# Patient Record
Sex: MALE | Race: WHITE | NOT HISPANIC OR LATINO | Employment: OTHER | URBAN - METROPOLITAN AREA
[De-identification: names, ages, dates, MRNs, and addresses within clinical notes are randomized per-mention and may not be internally consistent; named-entity substitution may affect disease eponyms.]

---

## 2017-01-27 ENCOUNTER — OFFICE VISIT (OUTPATIENT)
Dept: UROLOGY | Facility: CLINIC | Age: 62
End: 2017-01-27
Payer: COMMERCIAL

## 2017-01-27 VITALS
RESPIRATION RATE: 18 BRPM | HEART RATE: 61 BPM | HEIGHT: 66 IN | WEIGHT: 142 LBS | BODY MASS INDEX: 22.82 KG/M2 | SYSTOLIC BLOOD PRESSURE: 155 MMHG | DIASTOLIC BLOOD PRESSURE: 88 MMHG

## 2017-01-27 DIAGNOSIS — R97.20 ELEVATED PSA: Primary | ICD-10-CM

## 2017-01-27 DIAGNOSIS — N40.0 BPH WITHOUT URINARY OBSTRUCTION: ICD-10-CM

## 2017-01-27 LAB
BILIRUB SERPL-MCNC: NEGATIVE MG/DL
BLOOD, POC UA: NEGATIVE
GLUCOSE UR QL STRIP: NEGATIVE
KETONES UR QL STRIP: NEGATIVE
LEUKOCYTE ESTERASE URINE, POC: NEGATIVE
NITRITE, POC UA: NEGATIVE
PH, POC UA: 5
PROTEIN, POC: NEGATIVE
SPECIFIC GRAVITY, POC UA: 1.01
UROBILINOGEN, POC UA: NEGATIVE

## 2017-01-27 PROCEDURE — 99214 OFFICE O/P EST MOD 30 MIN: CPT | Mod: 25,S$GLB,, | Performed by: UROLOGY

## 2017-01-27 PROCEDURE — 81000 URINALYSIS NONAUTO W/SCOPE: CPT | Mod: S$GLB,,, | Performed by: UROLOGY

## 2017-01-27 PROCEDURE — 1159F MED LIST DOCD IN RCRD: CPT | Mod: S$GLB,,, | Performed by: UROLOGY

## 2017-01-27 PROCEDURE — 99999 PR PBB SHADOW E&M-EST. PATIENT-LVL III: CPT | Mod: PBBFAC,,, | Performed by: UROLOGY

## 2017-01-27 RX ORDER — ASPIRIN 81 MG/1
81 TABLET ORAL DAILY
COMMUNITY

## 2017-01-27 RX ORDER — BENAZEPRIL HYDROCHLORIDE 20 MG/1
20 TABLET ORAL DAILY
COMMUNITY
Start: 2016-03-03

## 2017-01-27 RX ORDER — ATORVASTATIN CALCIUM 10 MG/1
10 TABLET, FILM COATED ORAL DAILY
COMMUNITY

## 2017-01-27 RX ORDER — METOPROLOL SUCCINATE 50 MG/1
50 TABLET, EXTENDED RELEASE ORAL DAILY
COMMUNITY
Start: 2016-11-05

## 2017-01-27 RX ORDER — LEVOTHYROXINE SODIUM 25 UG/1
25 TABLET ORAL
COMMUNITY
Start: 2016-03-03 | End: 2018-09-14

## 2017-01-27 NOTE — MR AVS SNAPSHOT
"    [facility]  [address] [facility] [address]  [place] [address]  Phone: [phone]                  Nikita Hoguen   2017 2:00 PM   Office Visit    Description:  Male : 1955   Provider:  MARTY So MD   Department:  [facility]           Reason for Visit     Elevated PSA           Diagnoses this Visit        Comments    Elevated PSA    -  Primary            To Do List           Goals (5 Years of Data)     None      [facility]     [facility] On Call Nurse Care Line -  Assistance  Registered nurses in the [facility] On Call Center provide clinical advisement, health education, appointment booking, and other advisory services.  Call for this free service at [phone].             Medications                Verify that the below list of medications is an accurate representation of the medications you are currently taking.  If none reported, the list may be blank. If incorrect, please contact your healthcare provider. Carry this list with you in case of emergency.           Current Medications     aspirin (ECOTRIN) 81 MG EC tablet Take 81 mg by mouth.    atorvastatin (LIPITOR) 10 MG tablet Take 10 mg by mouth.    benazepril (LOTENSIN) 20 MG tablet     levothyroxine (SYNTHROID) 25 MCG tablet     TOPROL XL 50 mg 24 hr tablet            Clinical Reference Information           Vital Signs - Last Recorded  Most recent update: 2017  2:27 PM by Ruba Moreno LPN    BP Pulse Resp Ht Wt BMI    (!) 155/88 (BP Location: Right arm, Patient Position: Sitting, BP Method: Automatic) 61 18 5' 6" (1.676 m) 64.4 kg (141 lb 15.6 oz) 22.92 kg/m2      Blood Pressure          Most Recent Value    BP  (!)  155/88      Allergies as of 2017     No Known Allergies      Immunizations Administered on Date of Encounter - 2017     None      Orders Placed During Today's Visit      Normal Orders This Visit    POCT URINALYSIS       "

## 2017-01-27 NOTE — PROGRESS NOTES
Subjective:       Patient ID: Nikita Rush is a 61 y.o. male.    Chief Complaint: Elevated PSA (6 month follow up)    HPI     61 year old with elevated PSA. He gets regular annual screening and his previous PSA have all been low until Jan 2015, He PSA was increased to 4.2 and then repeat several months later and it increased to 5.7.  He underwent prostate biopsy June 2015 and the path was mostly benign, 1 core HGPIN.  He has no complaints today. His PSA is 6.4 and has been stable for the last year.  He had no complication from the last biopsy. He denies hematuria and dysuria.  He has no family history of prostate cancer.  Urine dipstick shows negative for all components.    Review of Systems   Constitutional: Negative for fever.   Genitourinary: Negative for dysuria and hematuria.       Objective:      Physical Exam   Constitutional: He is oriented to person, place, and time. He appears well-developed and well-nourished.   HENT:   Head: Normocephalic and atraumatic.   Eyes: Conjunctivae are normal.   Cardiovascular: Normal rate.    Pulmonary/Chest: Effort normal.   Abdominal: He exhibits no mass. No hernia.   Genitourinary: Rectal exam shows no mass and anal tone normal. Prostate is enlarged (40g s/s/a). Prostate is not tender.   Musculoskeletal: Normal range of motion.   Neurological: He is alert and oriented to person, place, and time.   Skin: Skin is warm and dry. No rash noted.   Psychiatric: He has a normal mood and affect.   Vitals reviewed.      Assessment:       1. Elevated PSA        Plan:       Elevated PSA  -     POCT URINALYSIS      Follow-up 6 months with repeat PSA.

## 2017-07-28 ENCOUNTER — OFFICE VISIT (OUTPATIENT)
Dept: UROLOGY | Facility: CLINIC | Age: 62
End: 2017-07-28
Payer: COMMERCIAL

## 2017-07-28 VITALS
WEIGHT: 145.75 LBS | BODY MASS INDEX: 23.42 KG/M2 | HEIGHT: 66 IN | DIASTOLIC BLOOD PRESSURE: 87 MMHG | SYSTOLIC BLOOD PRESSURE: 147 MMHG | HEART RATE: 63 BPM

## 2017-07-28 DIAGNOSIS — N40.0 BPH WITHOUT URINARY OBSTRUCTION: ICD-10-CM

## 2017-07-28 DIAGNOSIS — R97.20 ELEVATED PSA: Primary | ICD-10-CM

## 2017-07-28 LAB
BILIRUB SERPL-MCNC: NORMAL MG/DL
BLOOD URINE, POC: NORMAL
COLOR, POC UA: YELLOW
GLUCOSE UR QL STRIP: NORMAL
KETONES UR QL STRIP: NORMAL
LEUKOCYTE ESTERASE URINE, POC: NORMAL
NITRITE, POC UA: NORMAL
PH, POC UA: 5
PROTEIN, POC: NORMAL
SPECIFIC GRAVITY, POC UA: 1.01
UROBILINOGEN, POC UA: NORMAL

## 2017-07-28 PROCEDURE — 99214 OFFICE O/P EST MOD 30 MIN: CPT | Mod: 25,S$GLB,, | Performed by: UROLOGY

## 2017-07-28 PROCEDURE — 99999 PR PBB SHADOW E&M-EST. PATIENT-LVL III: CPT | Mod: PBBFAC,,, | Performed by: UROLOGY

## 2017-07-28 PROCEDURE — 81002 URINALYSIS NONAUTO W/O SCOPE: CPT | Mod: S$GLB,,, | Performed by: UROLOGY

## 2017-07-28 NOTE — PROGRESS NOTES
Subjective:       Patient ID: Nikita Rush is a 62 y.o. male.    Chief Complaint: Elevated PSA    HPI     62 year old with elevated PSA.  He gets regular annual screening and his previous PSA have all been low until Jan 2015, He PSA was increased to 4.2 and then repeat several months later and it increased to 5.7.  He underwent prostate biopsy June 2015 and the path was mostly benign, 1 core HGPIN.  He has no complaints today. His PSA is 6.2 and has been stable for the last year.  PSA 6 months ago was 6.4.  He had no complication from the last biopsy.  He denies hematuria and dysuria.  He has no family history of prostate cancer.  Urine dipstick shows negative for all components.    Review of Systems   Constitutional: Negative for fever.   Genitourinary: Negative for dysuria and hematuria.       Objective:      Physical Exam   Constitutional: He is oriented to person, place, and time. He appears well-developed and well-nourished.   HENT:   Head: Normocephalic and atraumatic.   Eyes: Conjunctivae are normal.   Cardiovascular: Normal rate.    Pulmonary/Chest: Effort normal.   Genitourinary: Rectal exam shows no mass and anal tone normal. Prostate is enlarged (>50g s/s/a). Prostate is not tender.   Musculoskeletal: Normal range of motion.   Neurological: He is alert and oriented to person, place, and time.   Skin: Skin is warm and dry. No rash noted.   Psychiatric: He has a normal mood and affect.   Vitals reviewed.      Assessment:       1. Elevated PSA    2. BPH without urinary obstruction        Plan:       Elevated PSA  -     POCT URINE DIPSTICK WITHOUT MICROSCOPE    BPH without urinary obstruction      Will continue to observe.  RTC 6 months with repeat PSA

## 2018-01-26 ENCOUNTER — OFFICE VISIT (OUTPATIENT)
Dept: UROLOGY | Facility: CLINIC | Age: 63
End: 2018-01-26
Payer: COMMERCIAL

## 2018-01-26 VITALS
SYSTOLIC BLOOD PRESSURE: 112 MMHG | HEIGHT: 66 IN | WEIGHT: 145.5 LBS | DIASTOLIC BLOOD PRESSURE: 62 MMHG | BODY MASS INDEX: 23.38 KG/M2 | HEART RATE: 78 BPM

## 2018-01-26 DIAGNOSIS — R97.20 ELEVATED PSA: Primary | ICD-10-CM

## 2018-01-26 DIAGNOSIS — N40.0 BPH WITHOUT URINARY OBSTRUCTION: ICD-10-CM

## 2018-01-26 LAB
BILIRUB SERPL-MCNC: NORMAL MG/DL
BLOOD URINE, POC: NORMAL
COLOR, POC UA: YELLOW
GLUCOSE UR QL STRIP: NORMAL
KETONES UR QL STRIP: NORMAL
LEUKOCYTE ESTERASE URINE, POC: NORMAL
NITRITE, POC UA: NORMAL
PH, POC UA: 6
PROTEIN, POC: NORMAL
SPECIFIC GRAVITY, POC UA: 1
UROBILINOGEN, POC UA: NORMAL

## 2018-01-26 PROCEDURE — 81002 URINALYSIS NONAUTO W/O SCOPE: CPT | Mod: S$GLB,,, | Performed by: UROLOGY

## 2018-01-26 PROCEDURE — 99214 OFFICE O/P EST MOD 30 MIN: CPT | Mod: 25,S$GLB,, | Performed by: UROLOGY

## 2018-01-26 PROCEDURE — 99999 PR PBB SHADOW E&M-EST. PATIENT-LVL III: CPT | Mod: PBBFAC,,, | Performed by: UROLOGY

## 2018-01-26 RX ORDER — LEVOFLOXACIN 500 MG/1
500 TABLET, FILM COATED ORAL DAILY
Qty: 3 TABLET | Refills: 0 | Status: SHIPPED | OUTPATIENT
Start: 2018-01-26 | End: 2018-01-29

## 2018-01-26 NOTE — PROGRESS NOTES
Subjective:       Patient ID: Nikita Rush is a 62 y.o. male.    Chief Complaint: Elevated PSA    HPI     62 year old with elevated PSA.  He gets regular annual screening and his previous PSA have all been low until Jan 2015, He PSA was increased to 4.2 and then repeat several months later and it increased to 5.7.  He underwent prostate biopsy June 2015 and the path was mostly benign, 1 core HGPIN.  He has no complaints today. His PSA is now increased ti 7.8 from 6.2 (07/2017)  He had no complication from the last biopsy.  He denies hematuria and dysuria.  He has no family history of prostate cancer.  Urine dipstick shows negative for all components.      Review of Systems   Constitutional: Negative for fever.   Genitourinary: Negative for dysuria and hematuria.       Objective:      Physical Exam   Constitutional: He is oriented to person, place, and time. He appears well-developed and well-nourished.   HENT:   Head: Normocephalic and atraumatic.   Eyes: Conjunctivae are normal.   Cardiovascular: Normal rate.    Pulmonary/Chest: Effort normal.   Genitourinary: Rectal exam shows no mass and anal tone normal. Prostate is enlarged (40g, s/s/a). Prostate is not tender.   Musculoskeletal: Normal range of motion. He exhibits no edema.   Neurological: He is alert and oriented to person, place, and time.   Skin: Skin is warm and dry. No rash noted.   Psychiatric: He has a normal mood and affect.   Vitals reviewed.      Assessment:       1. Elevated PSA    2. BPH without urinary obstruction        Plan:       Elevated PSA  -     POCT URINE DIPSTICK WITHOUT MICROSCOPE    BPH without urinary obstruction    Other orders  -     levoFLOXacin (LEVAQUIN) 500 MG tablet; Take 1 tablet (500 mg total) by mouth once daily.  Dispense: 3 tablet; Refill: 0      I recommend repeat prostate biopsy.  Hold ASA for 5 days

## 2018-02-02 ENCOUNTER — TELEPHONE (OUTPATIENT)
Dept: UROLOGY | Facility: CLINIC | Age: 63
End: 2018-02-02

## 2018-02-02 NOTE — TELEPHONE ENCOUNTER
Pt requested to move his surgery date bact due to work. I spoke to svetlana in the [facility] and moved his surgery to 3/6/18. PT verbalized understanding.

## 2018-02-02 NOTE — TELEPHONE ENCOUNTER
----- Message from Yeimy Lua sent at 2/2/2018  9:07 AM CST -----  Contact: Patient  Patient states that he would like to talk to you about the procedure that is scheduled for 02/20/2018.  Please call the patient back at [phone].  Thank you

## 2018-03-05 ENCOUNTER — ANESTHESIA EVENT (OUTPATIENT)
Dept: SURGERY | Facility: HOSPITAL | Age: 63
End: 2018-03-05
Payer: COMMERCIAL

## 2018-03-06 ENCOUNTER — ANESTHESIA (OUTPATIENT)
Dept: SURGERY | Facility: HOSPITAL | Age: 63
End: 2018-03-06
Payer: COMMERCIAL

## 2018-03-06 ENCOUNTER — HOSPITAL ENCOUNTER (OUTPATIENT)
Facility: HOSPITAL | Age: 63
Discharge: HOME | End: 2018-03-06
Attending: UROLOGY | Admitting: UROLOGY
Payer: COMMERCIAL

## 2018-03-06 ENCOUNTER — SURGERY (OUTPATIENT)
Age: 63
End: 2018-03-06

## 2018-03-06 VITALS
TEMPERATURE: 98 F | SYSTOLIC BLOOD PRESSURE: 120 MMHG | OXYGEN SATURATION: 98 % | HEART RATE: 75 BPM | BODY MASS INDEX: 23.3 KG/M2 | DIASTOLIC BLOOD PRESSURE: 66 MMHG | HEIGHT: 66 IN | WEIGHT: 145 LBS | RESPIRATION RATE: 16 BRPM

## 2018-03-06 DIAGNOSIS — R97.20 ELEVATED PSA: Primary | ICD-10-CM

## 2018-03-06 PROCEDURE — 76942 ECHO GUIDE FOR BIOPSY: CPT | Mod: 26,59,, | Performed by: UROLOGY

## 2018-03-06 PROCEDURE — 36000704 HC OR TIME LEV I 1ST 15 MIN: Mod: PO | Performed by: UROLOGY

## 2018-03-06 PROCEDURE — 88305 TISSUE EXAM BY PATHOLOGIST: CPT | Performed by: PATHOLOGY

## 2018-03-06 PROCEDURE — 37000008 HC ANESTHESIA 1ST 15 MINUTES: Mod: PO | Performed by: UROLOGY

## 2018-03-06 PROCEDURE — 55700 PR BIOPSY OF PROSTATE,NEEDLE/PUNCH: CPT | Mod: ,,, | Performed by: UROLOGY

## 2018-03-06 PROCEDURE — D9220A PRA ANESTHESIA: Mod: CRNA,,, | Performed by: NURSE ANESTHETIST, CERTIFIED REGISTERED

## 2018-03-06 PROCEDURE — 63600175 PHARM REV CODE 636 W HCPCS: Mod: PO | Performed by: NURSE ANESTHETIST, CERTIFIED REGISTERED

## 2018-03-06 PROCEDURE — 36000705 HC OR TIME LEV I EA ADD 15 MIN: Mod: PO | Performed by: UROLOGY

## 2018-03-06 PROCEDURE — 25000003 PHARM REV CODE 250: Mod: PO | Performed by: UROLOGY

## 2018-03-06 PROCEDURE — 37000009 HC ANESTHESIA EA ADD 15 MINS: Mod: PO | Performed by: UROLOGY

## 2018-03-06 PROCEDURE — 71000033 HC RECOVERY, INTIAL HOUR: Mod: PO | Performed by: UROLOGY

## 2018-03-06 PROCEDURE — 88305 TISSUE EXAM BY PATHOLOGIST: CPT | Mod: 26,,, | Performed by: PATHOLOGY

## 2018-03-06 PROCEDURE — 76872 US TRANSRECTAL: CPT | Mod: 26,,, | Performed by: UROLOGY

## 2018-03-06 PROCEDURE — D9220A PRA ANESTHESIA: Mod: ANES,,, | Performed by: ANESTHESIOLOGY

## 2018-03-06 PROCEDURE — 63600175 PHARM REV CODE 636 W HCPCS: Mod: PO | Performed by: UROLOGY

## 2018-03-06 RX ORDER — FENTANYL CITRATE 50 UG/ML
INJECTION, SOLUTION INTRAMUSCULAR; INTRAVENOUS
Status: DISCONTINUED | OUTPATIENT
Start: 2018-03-06 | End: 2018-03-06

## 2018-03-06 RX ORDER — LIDOCAINE HCL/PF 100 MG/5ML
SYRINGE (ML) INTRAVENOUS
Status: DISCONTINUED | OUTPATIENT
Start: 2018-03-06 | End: 2018-03-06

## 2018-03-06 RX ORDER — SODIUM CHLORIDE 9 MG/ML
INJECTION, SOLUTION INTRAVENOUS CONTINUOUS
Status: DISCONTINUED | OUTPATIENT
Start: 2018-03-06 | End: 2018-03-06

## 2018-03-06 RX ORDER — LIDOCAINE HYDROCHLORIDE 10 MG/ML
0.5 INJECTION, SOLUTION EPIDURAL; INFILTRATION; INTRACAUDAL; PERINEURAL ONCE AS NEEDED
Status: DISCONTINUED | OUTPATIENT
Start: 2018-03-06 | End: 2018-03-06 | Stop reason: HOSPADM

## 2018-03-06 RX ORDER — LIDOCAINE HYDROCHLORIDE 10 MG/ML
1 INJECTION, SOLUTION EPIDURAL; INFILTRATION; INTRACAUDAL; PERINEURAL ONCE
Status: DISCONTINUED | OUTPATIENT
Start: 2018-03-06 | End: 2018-03-06 | Stop reason: HOSPADM

## 2018-03-06 RX ORDER — GENTAMICIN SULFATE 80 MG/100ML
80 INJECTION, SOLUTION INTRAVENOUS
Status: COMPLETED | OUTPATIENT
Start: 2018-03-06 | End: 2018-03-06

## 2018-03-06 RX ORDER — MIDAZOLAM HYDROCHLORIDE 1 MG/ML
INJECTION, SOLUTION INTRAMUSCULAR; INTRAVENOUS
Status: DISCONTINUED | OUTPATIENT
Start: 2018-03-06 | End: 2018-03-06

## 2018-03-06 RX ORDER — PROPOFOL 10 MG/ML
VIAL (ML) INTRAVENOUS
Status: DISCONTINUED | OUTPATIENT
Start: 2018-03-06 | End: 2018-03-06

## 2018-03-06 RX ORDER — SODIUM CHLORIDE, SODIUM LACTATE, POTASSIUM CHLORIDE, CALCIUM CHLORIDE 600; 310; 30; 20 MG/100ML; MG/100ML; MG/100ML; MG/100ML
INJECTION, SOLUTION INTRAVENOUS CONTINUOUS
Status: DISCONTINUED | OUTPATIENT
Start: 2018-03-06 | End: 2018-03-06 | Stop reason: HOSPADM

## 2018-03-06 RX ORDER — LIDOCAINE HYDROCHLORIDE 10 MG/ML
INJECTION INFILTRATION; PERINEURAL
Status: DISCONTINUED | OUTPATIENT
Start: 2018-03-06 | End: 2018-03-06 | Stop reason: HOSPADM

## 2018-03-06 RX ADMIN — LIDOCAINE HYDROCHLORIDE 50 MG: 20 INJECTION PARENTERAL at 07:03

## 2018-03-06 RX ADMIN — Medication 80 MG: at 06:03

## 2018-03-06 RX ADMIN — LIDOCAINE HYDROCHLORIDE 10 ML: 10 INJECTION, SOLUTION INFILTRATION; PERINEURAL at 07:03

## 2018-03-06 RX ADMIN — PROPOFOL 50 MG: 10 INJECTION, EMULSION INTRAVENOUS at 07:03

## 2018-03-06 RX ADMIN — FENTANYL CITRATE 100 MCG: 50 INJECTION, SOLUTION INTRAMUSCULAR; INTRAVENOUS at 07:03

## 2018-03-06 RX ADMIN — MIDAZOLAM HYDROCHLORIDE 2 MG: 1 INJECTION, SOLUTION INTRAMUSCULAR; INTRAVENOUS at 06:03

## 2018-03-06 RX ADMIN — SODIUM CHLORIDE, SODIUM LACTATE, POTASSIUM CHLORIDE, CALCIUM CHLORIDE: 600; 310; 30; 20 INJECTION, SOLUTION INTRAVENOUS at 06:03

## 2018-03-06 NOTE — OP NOTE
[facility]  Surgery Department  Operative Note    SUMMARY     Date of Procedure: 3/6/2018     Procedure: Procedure(s) (LRB):  TRANSRECTAL ULTRASOUND GUIDED PROSTATE BIOPSY (N/A)     Surgeon(s) and Role:     * MARTY So MD - Primary    Assisting Surgeon: None    Pre-Operative Diagnosis: Elevated PSA [R97.20]    Post-Operative Diagnosis: Post-Op Diagnosis Codes:     * Elevated PSA [R97.20]    Anesthesia: Local MAC    Technical Procedures Used: endoscopy    Description of the Findings of the Procedure: 94.8 vol     Significant Surgical Tasks Conducted by the Assistant(s), if Applicable: n/a    Complications: No    Estimated Blood Loss (EBL): * No values recorded between 3/6/2018  7:06 AM and 3/6/2018  7:20 AM *           Implants: * No implants in log *    Specimens:   Specimen (12h ago through future)    Start     Ordered    03/06/18 0708  Specimen to Pathology - Surgery  Once     Comments:  Pre-op Diagnosis: Elevated PSA [R97.20]Post-op Diagnosis: unknownProcedure(s):TRANSRECTAL ULTRASOUND GUIDED PROSTATE BIOPSY Number of specimens: Name of specimens: 1. Left base 2. Left mid 3. Left apex 4. Right base 5. Right mid 6. Right apex      03/06/18 0709                  Condition: Stable    Disposition: PACU - hemodynamically stable.    Attestation: I performed the procedure.

## 2018-03-06 NOTE — DISCHARGE SUMMARY
[facility]  Discharge Note  Short Stay    Admit Date: 3/6/2018    Discharge Date and Time: No discharge date for patient encounter.     Attending Physician: MARTY So MD     Discharge Provider: CYNTHIA So    Diagnoses:  Active Hospital Problems    Diagnosis  POA    Elevated PSA [R97.20]  Yes      Resolved Hospital Problems    Diagnosis Date Resolved POA   No resolved problems to display.       Discharged Condition: good    Hospital Course: Patient was admitted for an outpatient procedure and tolerated the procedure well with no complications.    Final Diagnoses: Same as principal problem.    Disposition: Home or Self Care    Follow up/Patient Instructions:    Medications:  Reconciled Home Medications:   Current Discharge Medication List      CONTINUE these medications which have NOT CHANGED    Details   aspirin (ECOTRIN) 81 MG EC tablet Take 81 mg by mouth once daily.       atorvastatin (LIPITOR) 10 MG tablet Take 10 mg by mouth once daily.       benazepril (LOTENSIN) 20 MG tablet Take 20 mg by mouth once daily.       levothyroxine (SYNTHROID) 25 MCG tablet Take 25 mcg by mouth before breakfast.       TOPROL XL 50 mg 24 hr tablet Take 50 mg by mouth once daily.              Discharge Procedure Orders  Call MD for:  temperature >100.4     Call MD for:  persistent nausea and vomiting or diarrhea     Call MD for:  severe uncontrolled pain     Activity as tolerated   Scheduling Instructions: Keep hydrated  I will call for follow up           Discharge Procedure Orders (must include Diet, Follow-up, Activity):    Discharge Procedure Orders (must include Diet, Follow-up, Activity)  Call MD for:  temperature >100.4     Call MD for:  persistent nausea and vomiting or diarrhea     Call MD for:  severe uncontrolled pain     Activity as tolerated   Scheduling Instructions: Keep hydrated  I will call for follow up

## 2018-03-06 NOTE — DISCHARGE INSTRUCTIONS
PROSTATE BIOPSY      DOS:   Minimal activity for 24 hours.   May shower or bathe today.   Advance diet as tolerated.   Drink a lot of liquids until you see your doctor.   Expect blood in urine/stool for up to 3 weeks.   Expect blood in semen for up to 8 weeks.   Resume home medications as prescribed   Biopsy results may not be available for 10-14 days    DONT:   No driving for 24 hours or while taking narcotic pain medication   No aspirin, NSAIDS or blood thinners for 7 days   No sexual intercourse, heavy lifting, or strenuous activity for 7 days.   DO NOT TAKE ADDITIONAL TYLENOL/ACETAMINOPHEN WHILE TAKING NARCOTIC PAIN MEDICATION THAT CONTAINS TYLENOL/ACETAMINOPHEN.    CALL PHYSICIAN FOR:   Unable to urinate within 6 hours after surgery.   Excessive bleeding.    Fever>101   Persistent pain not relieved by pain medication.    Contact your physician for emergencies at [phone]       Discharge Instructions: After Your Surgery  Youve just had surgery. During surgery, you were given medicine called anesthesia to keep you relaxed and free of pain. After surgery, you may have some pain or nausea. This is common. Here are some tips for feeling better and getting well after surgery.     Stay on schedule with your medicine.   Going home  Your healthcare provider will show you how to take care of yourself when you go home. He or she will also answer your questions. Have an adult family member or friend drive you home. For the first 24 hours after your surgery:  · Do not drive or use heavy equipment.  · Do not make important decisions or sign legal papers.  · Do not drink alcohol.  · Have someone stay with you, if needed. He or she can watch for problems and help keep you safe.  Be sure to go to all follow-up visits with your healthcare provider. And rest after your surgery for as long as your healthcare provider tells you to.  Coping with pain  If you have pain after surgery, pain medicine will help  you feel better. Take it as told, before pain becomes severe. Also, ask your healthcare provider or pharmacist about other ways to control pain. This might be with heat, ice, or relaxation. And follow any other instructions your surgeon or nurse gives you.  Tips for taking pain medicine  To get the best relief possible, remember these points:  · Pain medicines can upset your stomach. Taking them with a little food may help.  · Most pain relievers taken by mouth need at least 20 to 30 minutes to start to work.  · Taking medicine on a schedule can help you remember to take it. Try to time your medicine so that you can take it before starting an activity. This might be before you get dressed, go for a walk, or sit down for dinner.  · Constipation is a common side effect of pain medicines. Call your healthcare provider before taking any medicines such as laxatives or stool softeners to help ease constipation. Also ask if you should skip any foods. Drinking lots of fluids and eating foods such as fruits and vegetables that are high in fiber can also help. Remember, do not take laxatives unless your surgeon has prescribed them.  · Drinking alcohol and taking pain medicine can cause dizziness and slow your breathing. It can even be deadly. Do not drink alcohol while taking pain medicine.  · Pain medicine can make you react more slowly to things. Do not drive or run machinery while taking pain medicine.  Your healthcare provider may tell you to take acetaminophen to help ease your pain. Ask him or her how much you are supposed to take each day. Acetaminophen or other pain relievers may interact with your prescription medicines or other over-the-counter (OTC) medicines. Some prescription medicines have acetaminophen and other ingredients. Using both prescription and OTC acetaminophen for pain can cause you to overdose. Read the labels on your OTC medicines with care. This will help you to clearly know the list of  ingredients, how much to take, and any warnings. It may also help you not take too much acetaminophen. If you have questions or do not understand the information, ask your pharmacist or healthcare provider to explain it to you before you take the OTC medicine.  Managing nausea  Some people have an upset stomach after surgery. This is often because of anesthesia, pain, or pain medicine, or the stress of surgery. These tips will help you handle nausea and eat healthy foods as you get better. If you were on a special food plan before surgery, ask your healthcare provider if you should follow it while you get better. These tips may help:  · Do not push yourself to eat. Your body will tell you when to eat and how much.  · Start off with clear liquids and soup. They are easier to digest.  · Next try semi-solid foods, such as mashed potatoes, applesauce, and gelatin, as you feel ready.  · Slowly move to solid foods. Dont eat fatty, rich, or spicy foods at first.  · Do not force yourself to have 3 large meals a day. Instead eat smaller amounts more often.  · Take pain medicines with a small amount of solid food, such as crackers or toast, to avoid nausea.     Call your surgeon if  · You still have pain an hour after taking medicine. The medicine may not be strong enough.  · You feel too sleepy, dizzy, or groggy. The medicine may be too strong.  · You have side effects like nausea, vomiting, or skin changes, such as rash, itching, or hives.       If you have obstructive sleep apnea  You were given anesthesia medicine during surgery to keep you comfortable and free of pain. After surgery, you may have more apnea spells because of this medicine and other medicines you were given. The spells may last longer than usual.   At home:  · Keep using the continuous positive airway pressure (CPAP) device when you sleep. Unless your healthcare provider tells you not to, use it when you sleep, day or night. CPAP is a common device used  to treat obstructive sleep apnea.  · Talk with your provider before taking any pain medicine, muscle relaxants, or sedatives. Your provider will tell you about the possible dangers of taking these medicines.  Date Last Reviewed: 12/1/2016 © 2000-2017 [employer]. [address] [place] [id number]. All rights reserved. This information is not intended as a substitute for professional medical care. Always follow your healthcare professional's instructions.

## 2018-03-06 NOTE — TRANSFER OF CARE
"Anesthesia Transfer of Care Note    Patient: Nikita Rush    Procedure(s) Performed: Procedure(s) (LRB):  TRANSRECTAL ULTRASOUND GUIDED PROSTATE BIOPSY (N/A)    Patient location: PACU    Anesthesia Type: MAC    Transport from OR: Transported from OR on room air with adequate spontaneous ventilation    Post pain: adequate analgesia    Post assessment: no apparent anesthetic complications    Post vital signs: stable    Level of consciousness: awake    Nausea/Vomiting: no nausea/vomiting    Complications: none    Transfer of care protocol was followed      Last vitals:   Visit Vitals  BP (!) 157/84 (BP Location: Right arm, Patient Position: Lying)   Pulse 65   Temp 36.6 °C (97.9 °F) (Skin)   Resp 16   Ht 5' 6" (1.676 m)   Wt 65.8 kg (145 lb)   SpO2 98%   BMI 23.40 kg/m²     "

## 2018-03-06 NOTE — ANESTHESIA PREPROCEDURE EVALUATION
03/06/2018  Nikita Rush is a 62 y.o., male.    Anesthesia Evaluation      I have reviewed the Medications.     Review of Systems  Anesthesia Hx:  No problems with previous Anesthesia   Social:  Non-Smoker, No Alcohol Use    Cardiovascular:   Hypertension hyperlipidemia    Pulmonary:  Pulmonary Normal  Denies Sleep Apnea.    Renal/:  Renal/ Normal     Hepatic/GI:  Hepatic/GI Normal    Neurological:  Neurology Normal    Endocrine:   Hypothyroidism        Physical Exam  General:  Well nourished    Airway/Jaw/Neck:  Airway Findings: Mouth Opening: Small, but > 3cm General Airway Assessment: Adult, Possible difficult intubation  Mallampati: IV  Improves to IV with phonation.  Jaw/Neck Findings:  Neck ROM: Extension Decreased, Mild       Chest/Lungs:  Chest/Lungs Findings: Clear to auscultation, Normal Respiratory Rate     Heart/Vascular:  Heart Findings: Rate: Normal  Rhythm: Regular Rhythm  Sounds: Normal  Heart murmur: negative Vascular Findings: Normal (No carotid bruits.)       Mental Status:  Mental Status Findings:  Cooperative, Alert and Oriented         Anesthesia Plan  Type of Anesthesia, risks & benefits discussed:  Anesthesia Type:  MAC  Patient's Preference:   Intra-op Monitoring Plan:   Intra-op Monitoring Plan Comments:   Post Op Pain Control Plan:   Post Op Pain Control Plan Comments:   Induction:    Beta Blocker:  Patient is not currently on a Beta-Blocker (No further documentation required).       Informed Consent: Patient understands risks and agrees with Anesthesia plan.  Questions answered. Anesthesia consent signed with patient.  ASA Score: 2     Day of Surgery Review of History & Physical:            Ready For Surgery From Anesthesia Perspective.

## 2018-03-06 NOTE — OP NOTE
DATE OF PROCEDURE:  03/06/2018.    PREOPERATIVE DIAGNOSIS:  Elevated PSA.    POSTOPERATIVE DIAGNOSIS:  Elevated PSA.    OPERATIVE PROCEDURES:  Transrectal ultrasound, ultrasound guidance and prostate   biopsy.    ANESTHESIA:  General.    COMPLICATIONS:  None.    SPECIMEN:  Prostate biopsy.    BLOOD LOSS:  None.    SURGEON:  Altaf So M.D.    ASSISTANT:  None.    INDICATIONS FOR PROCEDURE:  Mr. Rush is a 62-year-old male with a rising PSA.    His PSA increased to 7.8.  He did have a previous biopsy in 2015, which had   one core positive for high-grade PIN; I recommended repeat biopsy.    PROCEDURE IN DETAIL:  After informed consent was obtained, he was brought to the   Operating Room.  He was given preoperative antibiotics.  After adequate   sedation was achieved, he was placed in the left lateral decubitus position.  On   digital rectal exam, the prostate was smooth, symmetrical and anodular.  The   transrectal ultrasound was then placed into the rectum and dynamic images were   obtained of the entire gland.  There was rather homogenous echotexture.  There   was a fairly large gland and the calculated volume was 94.8 mL.  A standard   prostate block was then performed by injecting 10 mL of 1% lidocaine into the   periprostatic spaces.  A standard template prostate biopsy was then performed.    Twelve cores were obtained in total.  Biopsy specimens were fully submerged in   formalin, labeled with the patient's name and sent for pathological evaluation.    At completion of procedure, hemostasis appeared to be adequate.  The ultrasound   probe was removed.  He was repositioned supine, awakened and transported to the   PACU in stable condition.      WC/HN  dd: 03/06/2018 08:17:04 (CST)  td: 03/06/2018 09:47:06 (CST)  Doc ID   #[id number]  Job ID #[id number]    CC:

## 2018-03-06 NOTE — H&P
Chief Complaint: Elevated PSA     HPI      62 year old with elevated PSA.  He gets regular annual screening and his previous PSA have all been low until Jan 2015, He PSA was increased to 4.2 and then repeat several months later and it increased to 5.7.  He underwent prostate biopsy June 2015 and the path was mostly benign, 1 core HGPIN.  He has no complaints today. His PSA is now increased ti 7.8 from 6.2 (07/2017)  He had no complication from the last biopsy.  He denies hematuria and dysuria.  He has no family history of prostate cancer.  Urine dipstick shows negative for all components.        Review of Systems   Constitutional: Negative for fever.   Genitourinary: Negative for dysuria and hematuria.       Objective:   Physical Exam   Constitutional: He is oriented to person, place, and time. He appears well-developed and well-nourished.   HENT:   Head: Normocephalic and atraumatic.   Eyes: Conjunctivae are normal.   Cardiovascular: Normal rate.    Pulmonary/Chest: Effort normal.   Genitourinary: Rectal exam shows no mass and anal tone normal. Prostate is enlarged (40g, s/s/a). Prostate is not tender.   Musculoskeletal: Normal range of motion. He exhibits no edema.   Neurological: He is alert and oriented to person, place, and time.   Skin: Skin is warm and dry. No rash noted.   Psychiatric: He has a normal mood and affect.   Vitals reviewed.      Assessment:       1. Elevated PSA    2. BPH without urinary obstruction        Plan:       Elevated PSA  -     POCT URINE DIPSTICK WITHOUT MICROSCOPE     BPH without urinary obstruction     Other orders  -     levoFLOXacin (LEVAQUIN) 500 MG tablet; Take 1 tablet (500 mg total) by mouth once daily.  Dispense: 3 tablet; Refill: 0       I recommend repeat prostate biopsy.  Hold ASA for 5 days

## 2018-03-06 NOTE — ANESTHESIA POSTPROCEDURE EVALUATION
"Anesthesia Post Evaluation    Patient: Nikita Rush    Procedure(s) Performed: Procedure(s) (LRB):  TRANSRECTAL ULTRASOUND GUIDED PROSTATE BIOPSY (N/A)    Final Anesthesia Type: MAC  Patient location during evaluation: PACU  Patient participation: Yes- Able to Participate  Level of consciousness: awake and alert  Post-procedure vital signs: reviewed and stable  Pain management: adequate  Airway patency: patent  PONV status at discharge: No PONV  Anesthetic complications: no      Cardiovascular status: blood pressure returned to baseline and hemodynamically stable  Respiratory status: unassisted, spontaneous ventilation and room air  Hydration status: euvolemic  Follow-up not needed.        Visit Vitals  /66 (BP Location: Left arm, Patient Position: Lying)   Pulse 75   Temp 36.7 °C (98 °F) (Skin)   Resp 16   Ht 5' 6" (1.676 m)   Wt 65.8 kg (145 lb)   SpO2 98%   BMI 23.40 kg/m²       Pain/Mouna Score: Pain Assessment Performed: Yes (3/6/2018  7:54 AM)  Presence of Pain: denies (3/6/2018  7:54 AM)  Mouna Score: 10 (3/6/2018  7:54 AM)      "

## 2018-03-09 DIAGNOSIS — R97.20 ELEVATED PSA: Primary | ICD-10-CM

## 2018-09-07 ENCOUNTER — LAB VISIT (OUTPATIENT)
Dept: LAB | Facility: HOSPITAL | Age: 63
End: 2018-09-07
Attending: FAMILY MEDICINE
Payer: COMMERCIAL

## 2018-09-07 DIAGNOSIS — R97.20 ELEVATED PSA: ICD-10-CM

## 2018-09-07 LAB — COMPLEXED PSA SERPL-MCNC: 7.3 NG/ML

## 2018-09-07 PROCEDURE — 36415 COLL VENOUS BLD VENIPUNCTURE: CPT | Mod: PO

## 2018-09-07 PROCEDURE — 84153 ASSAY OF PSA TOTAL: CPT

## 2018-09-14 ENCOUNTER — OFFICE VISIT (OUTPATIENT)
Dept: UROLOGY | Facility: CLINIC | Age: 63
End: 2018-09-14
Payer: COMMERCIAL

## 2018-09-14 VITALS
SYSTOLIC BLOOD PRESSURE: 112 MMHG | HEIGHT: 66 IN | DIASTOLIC BLOOD PRESSURE: 70 MMHG | HEART RATE: 67 BPM | BODY MASS INDEX: 23.59 KG/M2 | WEIGHT: 146.81 LBS

## 2018-09-14 DIAGNOSIS — R97.20 ELEVATED PSA: Primary | ICD-10-CM

## 2018-09-14 DIAGNOSIS — N40.0 BPH WITHOUT URINARY OBSTRUCTION: ICD-10-CM

## 2018-09-14 LAB
BILIRUB SERPL-MCNC: NORMAL MG/DL
BLOOD URINE, POC: NORMAL
COLOR, POC UA: YELLOW
GLUCOSE UR QL STRIP: NORMAL
KETONES UR QL STRIP: NORMAL
LEUKOCYTE ESTERASE URINE, POC: NORMAL
NITRITE, POC UA: NORMAL
PH, POC UA: 6
PROTEIN, POC: NORMAL
SPECIFIC GRAVITY, POC UA: 1.01
UROBILINOGEN, POC UA: NORMAL

## 2018-09-14 PROCEDURE — 99999 PR PBB SHADOW E&M-EST. PATIENT-LVL III: CPT | Mod: PBBFAC,,, | Performed by: UROLOGY

## 2018-09-14 PROCEDURE — 3008F BODY MASS INDEX DOCD: CPT | Mod: CPTII,S$GLB,, | Performed by: UROLOGY

## 2018-09-14 PROCEDURE — 99214 OFFICE O/P EST MOD 30 MIN: CPT | Mod: 25,S$GLB,, | Performed by: UROLOGY

## 2018-09-14 PROCEDURE — 81002 URINALYSIS NONAUTO W/O SCOPE: CPT | Mod: S$GLB,,, | Performed by: UROLOGY

## 2018-09-14 RX ORDER — LEVOTHYROXINE SODIUM 50 UG/1
50 TABLET ORAL
COMMUNITY
Start: 2018-08-03

## 2018-09-14 NOTE — PROGRESS NOTES
Subjective:       Patient ID: Nikita Rush is a 63 y.o. male.    Chief Complaint: Elevated PSA    HPI     63 year old with elevated PSA.  He gets regular annual screening and his previous PSA have all been low until Jan 2015, He PSA was increased to 4.2 and then repeat several months later and it increased to 5.7.  He underwent prostate biopsy June 2015 and the path was mostly benign, 1 core HGPIN.  He has no complaints today. His PSA then increased to 7.8 and he had a repeat biopsy in 03/2018.  Again path is one core HGPIN.  TRUS volume was 94.8 ml.  He had no complication from the last biopsy.  He denies hematuria and dysuria.  He has no family history of prostate cancer.  Repeat PSA is decreased to 7.3  Urine dipstick shows negative for all components.    Component PSA DIAGNOSTIC   Latest Ref Rng & Units 0.00 - 4.00 ng/mL   9/7/2018 7.3 (H)     Review of Systems   Constitutional: Negative for fever.   Genitourinary: Negative for dysuria and hematuria.       Objective:      Physical Exam   Constitutional: He is oriented to person, place, and time. He appears well-developed and well-nourished.   HENT:   Head: Normocephalic and atraumatic.   Eyes: Conjunctivae are normal.   Cardiovascular: Normal rate.   Pulmonary/Chest: Effort normal.   Genitourinary: Rectal exam shows no mass and anal tone normal. Prostate is enlarged (>50g, s/s/a). Prostate is not tender.   Musculoskeletal: Normal range of motion. He exhibits no edema.   Neurological: He is alert and oriented to person, place, and time.   Skin: Skin is warm and dry. No rash noted.   Psychiatric: He has a normal mood and affect.   Vitals reviewed.      Assessment:       1. Elevated PSA    2. BPH without urinary obstruction        Plan:       Elevated PSA  -     POCT URINE DIPSTICK WITHOUT MICROSCOPE  -     Prostate Specific Antigen, Diagnostic; Future; Expected date: 03/17/2019    BPH without urinary obstruction      PSA decreased and will observe for now.  We  discussed MRI if PSA starts rising.

## 2019-03-15 ENCOUNTER — LAB VISIT (OUTPATIENT)
Dept: LAB | Facility: HOSPITAL | Age: 64
End: 2019-03-15
Attending: UROLOGY
Payer: COMMERCIAL

## 2019-03-15 DIAGNOSIS — R97.20 ELEVATED PSA: ICD-10-CM

## 2019-03-15 LAB — COMPLEXED PSA SERPL-MCNC: 7 NG/ML

## 2019-03-15 PROCEDURE — 36415 COLL VENOUS BLD VENIPUNCTURE: CPT | Mod: PO

## 2019-03-15 PROCEDURE — 84153 ASSAY OF PSA TOTAL: CPT

## 2019-03-22 ENCOUNTER — OFFICE VISIT (OUTPATIENT)
Dept: UROLOGY | Facility: CLINIC | Age: 64
End: 2019-03-22
Payer: COMMERCIAL

## 2019-03-22 VITALS
HEIGHT: 66 IN | HEART RATE: 58 BPM | WEIGHT: 145 LBS | DIASTOLIC BLOOD PRESSURE: 86 MMHG | SYSTOLIC BLOOD PRESSURE: 147 MMHG | BODY MASS INDEX: 23.3 KG/M2

## 2019-03-22 DIAGNOSIS — N40.0 BPH WITHOUT URINARY OBSTRUCTION: ICD-10-CM

## 2019-03-22 DIAGNOSIS — R97.20 ELEVATED PSA: Primary | ICD-10-CM

## 2019-03-22 LAB
BILIRUB SERPL-MCNC: ABNORMAL MG/DL
BLOOD URINE, POC: ABNORMAL
COLOR, POC UA: ABNORMAL
GLUCOSE UR QL STRIP: ABNORMAL
KETONES UR QL STRIP: ABNORMAL
LEUKOCYTE ESTERASE URINE, POC: ABNORMAL
NITRITE, POC UA: ABNORMAL
PH, POC UA: 5.5
PROTEIN, POC: ABNORMAL
SPECIFIC GRAVITY, POC UA: 1005
UROBILINOGEN, POC UA: ABNORMAL

## 2019-03-22 PROCEDURE — 99214 OFFICE O/P EST MOD 30 MIN: CPT | Mod: 25,S$GLB,, | Performed by: UROLOGY

## 2019-03-22 PROCEDURE — 99214 PR OFFICE/OUTPT VISIT, EST, LEVL IV, 30-39 MIN: ICD-10-PCS | Mod: 25,S$GLB,, | Performed by: UROLOGY

## 2019-03-22 PROCEDURE — 3008F BODY MASS INDEX DOCD: CPT | Mod: CPTII,S$GLB,, | Performed by: UROLOGY

## 2019-03-22 PROCEDURE — 99999 PR PBB SHADOW E&M-EST. PATIENT-LVL III: CPT | Mod: PBBFAC,,, | Performed by: UROLOGY

## 2019-03-22 PROCEDURE — 81002 POCT URINE DIPSTICK WITHOUT MICROSCOPE: ICD-10-PCS | Mod: S$GLB,,, | Performed by: UROLOGY

## 2019-03-22 PROCEDURE — 3008F PR BODY MASS INDEX (BMI) DOCUMENTED: ICD-10-PCS | Mod: CPTII,S$GLB,, | Performed by: UROLOGY

## 2019-03-22 PROCEDURE — 99999 PR PBB SHADOW E&M-EST. PATIENT-LVL III: ICD-10-PCS | Mod: PBBFAC,,, | Performed by: UROLOGY

## 2019-03-22 PROCEDURE — 81002 URINALYSIS NONAUTO W/O SCOPE: CPT | Mod: S$GLB,,, | Performed by: UROLOGY

## 2019-03-22 NOTE — PROGRESS NOTES
Subjective:       Patient ID: Nikita Rush is a 63 y.o. male.    Chief Complaint: Follow-up (elevated PSA)    HPI     63 year old with elevated PSA.  He gets regular annual screening and his previous PSA have all been low until Jan 2015, He PSA was increased to 4.2 and then repeat several months later and it increased to 5.7.  He underwent prostate biopsy June 2015 and the path was mostly benign, 1 core HGPIN.  He has no complaints today. His PSA then increased to 7.8 and he had a repeat biopsy in 03/2018.  Again path is one core HGPIN.  TRUS volume was 94.8 ml.  He had no complication from the last biopsy.  He denies hematuria and dysuria.  He has no family history of prostate cancer.  Repeat PSA is decreased to 7.0  Urine dipstick shows negative for nitrites, leukocytes, glucose, protein, positive for trace blood      Component PSA DIAGNOSTIC   Latest Ref Rng & Units 0.00 - 4.00 ng/mL   3/15/2019 7.0 (H)   9/7/2018 7.3 (H)     Review of Systems   Constitutional: Negative for fever.   Genitourinary: Negative for dysuria and hematuria.       Objective:      Physical Exam   Constitutional: He is oriented to person, place, and time. He appears well-developed and well-nourished.   HENT:   Head: Normocephalic and atraumatic.   Eyes: Conjunctivae are normal.   Cardiovascular: Normal rate.   Pulmonary/Chest: Effort normal.   Genitourinary: Rectal exam shows no mass and anal tone normal. Prostate is enlarged (>50g, s/s/a). Prostate is not tender.   Musculoskeletal: Normal range of motion.   Neurological: He is alert and oriented to person, place, and time.   Skin: Skin is warm and dry. No rash noted.   Psychiatric: He has a normal mood and affect.   Vitals reviewed.      Assessment:       1. Elevated PSA    2. BPH without urinary obstruction        Plan:       Elevated PSA  -     POCT URINE DIPSTICK WITHOUT MICROSCOPE  -     Prostate Specific Antigen, Diagnostic; Future; Expected date: 03/21/2020    BPH without urinary  obstruction      PSA stable and 2 neg biopsies.  Rec annual follow up

## 2019-10-28 ENCOUNTER — TELEPHONE (OUTPATIENT)
Dept: UROLOGY | Facility: CLINIC | Age: 64
End: 2019-10-28

## 2019-10-28 NOTE — TELEPHONE ENCOUNTER
----- Message from Lexi Burnett sent at 10/28/2019  2:49 PM CDT -----  Type: Needs Medical Advice    Who Called:  Self Symptoms (please be specific):  Blood in Sieman                                       How long has patient had these symptoms:     Pharmacy name and phone #:     Best Call Back Number: [phone] (work)    Additional Information: please call to discuss

## 2019-10-31 ENCOUNTER — LAB VISIT (OUTPATIENT)
Dept: LAB | Facility: HOSPITAL | Age: 64
End: 2019-10-31
Attending: UROLOGY
Payer: COMMERCIAL

## 2019-10-31 DIAGNOSIS — R97.20 ELEVATED PSA: ICD-10-CM

## 2019-10-31 LAB — COMPLEXED PSA SERPL-MCNC: 7.4 NG/ML (ref 0–4)

## 2019-10-31 PROCEDURE — 84153 ASSAY OF PSA TOTAL: CPT

## 2019-10-31 PROCEDURE — 36415 COLL VENOUS BLD VENIPUNCTURE: CPT

## 2019-11-11 ENCOUNTER — OFFICE VISIT (OUTPATIENT)
Dept: UROLOGY | Facility: CLINIC | Age: 64
End: 2019-11-11
Payer: COMMERCIAL

## 2019-11-11 VITALS
HEART RATE: 68 BPM | BODY MASS INDEX: 23.13 KG/M2 | DIASTOLIC BLOOD PRESSURE: 83 MMHG | WEIGHT: 143.94 LBS | SYSTOLIC BLOOD PRESSURE: 148 MMHG | HEIGHT: 66 IN

## 2019-11-11 DIAGNOSIS — N40.0 BPH WITHOUT URINARY OBSTRUCTION: ICD-10-CM

## 2019-11-11 DIAGNOSIS — R36.1 HEMATOSPERMIA: Primary | ICD-10-CM

## 2019-11-11 DIAGNOSIS — R97.20 ELEVATED PSA: ICD-10-CM

## 2019-11-11 PROCEDURE — 1101F PT FALLS ASSESS-DOCD LE1/YR: CPT | Mod: CPTII,S$GLB,, | Performed by: UROLOGY

## 2019-11-11 PROCEDURE — 3008F BODY MASS INDEX DOCD: CPT | Mod: CPTII,S$GLB,, | Performed by: UROLOGY

## 2019-11-11 PROCEDURE — 99999 PR PBB SHADOW E&M-EST. PATIENT-LVL III: ICD-10-PCS | Mod: PBBFAC,,, | Performed by: UROLOGY

## 2019-11-11 PROCEDURE — 3008F PR BODY MASS INDEX (BMI) DOCUMENTED: ICD-10-PCS | Mod: CPTII,S$GLB,, | Performed by: UROLOGY

## 2019-11-11 PROCEDURE — 81002 URINALYSIS NONAUTO W/O SCOPE: CPT | Mod: S$GLB,,, | Performed by: UROLOGY

## 2019-11-11 PROCEDURE — 99999 PR PBB SHADOW E&M-EST. PATIENT-LVL III: CPT | Mod: PBBFAC,,, | Performed by: UROLOGY

## 2019-11-11 PROCEDURE — 99214 PR OFFICE/OUTPT VISIT, EST, LEVL IV, 30-39 MIN: ICD-10-PCS | Mod: 25,S$GLB,, | Performed by: UROLOGY

## 2019-11-11 PROCEDURE — 1101F PR PT FALLS ASSESS DOC 0-1 FALLS W/OUT INJ PAST YR: ICD-10-PCS | Mod: CPTII,S$GLB,, | Performed by: UROLOGY

## 2019-11-11 PROCEDURE — 81002 POCT URINE DIPSTICK WITHOUT MICROSCOPE: ICD-10-PCS | Mod: S$GLB,,, | Performed by: UROLOGY

## 2019-11-11 PROCEDURE — 99214 OFFICE O/P EST MOD 30 MIN: CPT | Mod: 25,S$GLB,, | Performed by: UROLOGY

## 2019-11-11 NOTE — PROGRESS NOTES
Subjective:       Patient ID: Nikita Rush is a 64 y.o. male.    Chief Complaint: No chief complaint on file.    HPI     64 year old with elevated PSA.  He gets regular annual screening and his previous PSA have all been low until Jan 2015, He PSA was increased to 4.2 and then repeat several months later and it increased to 5.7.  He underwent prostate biopsy June 2015 and the path was mostly benign, 1 core HGPIN.  He has no complaints today. His PSA then increased to 7.8 and he had a repeat biopsy in 03/2018.  Again path is one core HGPIN.  TRUS volume was 94.8 ml.  His most recent PSA is 7.4.  He complains of hematospermia beginning last week which is now mostly clear.  He has no other symptoms.  He denies dysuria frequency or urgency.  His flow is normal.  His urinalysis is clear today.    Urine dipstick shows negative for all components.     Component PSA DIAGNOSTIC   Latest Ref Rng & Units 0.00 - 4.00 ng/mL   10/31/2019 7.4 (H)   3/15/2019 7.0 (H)   9/7/2018 7.3 (H)       Review of Systems   Constitutional: Negative for fever.   Genitourinary: Negative for dysuria and hematuria.       Objective:      Physical Exam   Constitutional: He is oriented to person, place, and time. He appears well-developed and well-nourished.   HENT:   Head: Normocephalic and atraumatic.   Eyes: Conjunctivae are normal.   Cardiovascular: Normal rate.   Pulmonary/Chest: Effort normal.   Genitourinary: Rectal exam shows no mass and anal tone normal. Prostate is enlarged (>50g, s/s/a). Prostate is not tender.   Musculoskeletal: Normal range of motion.   Neurological: He is alert and oriented to person, place, and time.   Skin: Skin is warm and dry. No rash noted.   Psychiatric: He has a normal mood and affect.   Vitals reviewed.      Assessment:       1. Hematospermia    2. BPH without urinary obstruction    3. Elevated PSA        Plan:       Hematospermia  -     POCT URINE DIPSTICK WITHOUT MICROSCOPE    BPH without urinary  obstruction    Elevated PSA      reassurance, no treatment necessary.  Follow-up 1 year with repeat PSA

## 2020-12-20 ENCOUNTER — PATIENT MESSAGE (OUTPATIENT)
Dept: UROLOGY | Facility: CLINIC | Age: 65
End: 2020-12-20

## 2020-12-30 ENCOUNTER — OFFICE VISIT (OUTPATIENT)
Dept: UROLOGY | Facility: CLINIC | Age: 65
End: 2020-12-30
Payer: MEDICARE

## 2020-12-30 VITALS
WEIGHT: 149.69 LBS | HEART RATE: 60 BPM | SYSTOLIC BLOOD PRESSURE: 165 MMHG | HEIGHT: 66 IN | BODY MASS INDEX: 24.06 KG/M2 | DIASTOLIC BLOOD PRESSURE: 91 MMHG

## 2020-12-30 DIAGNOSIS — N40.0 BPH WITHOUT URINARY OBSTRUCTION: ICD-10-CM

## 2020-12-30 DIAGNOSIS — R97.20 ELEVATED PSA: Primary | ICD-10-CM

## 2020-12-30 LAB
BILIRUB SERPL-MCNC: ABNORMAL MG/DL
BLOOD URINE, POC: ABNORMAL
CLARITY, POC UA: CLEAR
COLOR, POC UA: YELLOW
GLUCOSE UR QL STRIP: ABNORMAL
KETONES UR QL STRIP: ABNORMAL
LEUKOCYTE ESTERASE URINE, POC: ABNORMAL
NITRITE, POC UA: ABNORMAL
PH, POC UA: 6
PROTEIN, POC: ABNORMAL
SPECIFIC GRAVITY, POC UA: 1.01
UROBILINOGEN, POC UA: 0.2

## 2020-12-30 PROCEDURE — 99214 PR OFFICE/OUTPT VISIT, EST, LEVL IV, 30-39 MIN: ICD-10-PCS | Mod: S$PBB,,, | Performed by: UROLOGY

## 2020-12-30 PROCEDURE — 99999 PR PBB SHADOW E&M-EST. PATIENT-LVL III: ICD-10-PCS | Mod: PBBFAC,,, | Performed by: UROLOGY

## 2020-12-30 PROCEDURE — 81002 URINALYSIS NONAUTO W/O SCOPE: CPT | Mod: PBBFAC,PO | Performed by: UROLOGY

## 2020-12-30 PROCEDURE — 99214 OFFICE O/P EST MOD 30 MIN: CPT | Mod: S$PBB,,, | Performed by: UROLOGY

## 2020-12-30 PROCEDURE — 99999 PR PBB SHADOW E&M-EST. PATIENT-LVL III: CPT | Mod: PBBFAC,,, | Performed by: UROLOGY

## 2020-12-30 PROCEDURE — 99213 OFFICE O/P EST LOW 20 MIN: CPT | Mod: PBBFAC,PO | Performed by: UROLOGY

## 2020-12-30 NOTE — PROGRESS NOTES
Subjective:       Patient ID: Nikita Rush is a 65 y.o. male.    Chief Complaint: Follow-up    HPI     65 year old with elevated PSA.  He gets regular annual screening and his previous PSA have all been low until Jan 2015, He PSA was increased to 4.2 and then repeat several months later and it increased to 5.7.  He underwent prostate biopsy June 2015 and the path was mostly benign, 1 core HGPIN.  He has no complaints today. His PSA then increased to 7.8 and he had a repeat biopsy in 03/2018.  Again path is one core HGPIN.  TRUS volume was 94.8 ml. He has no other symptoms today.  He denies dysuria frequency or urgency.   no more episodes of hematospermia.  Again, we discussed screening PSA and its limitation.  We discussed the possibility of early stage prostate cancer.  We discussed repeat prostate needle biopsy.  We discussed prostate MRI and possible targeted biopsy.  For now will continue to observe.  Urine dipstick shows negative for all components except trace blood.         Component PSA DIAGNOSTIC   Latest Ref Rng & Units 0.00 - 4.00 ng/mL   11/16/2020 9.0 (H)   10/31/2019 7.4 (H)   3/15/2019 7.0 (H)   9/7/2018 7.3 (H)         Review of Systems   Constitutional: Negative for fever.   Genitourinary: Negative for dysuria and hematuria.       Objective:      Physical Exam  Vitals signs reviewed.   Constitutional:       Appearance: He is well-developed.   HENT:      Head: Normocephalic and atraumatic.   Eyes:      Conjunctiva/sclera: Conjunctivae normal.   Cardiovascular:      Rate and Rhythm: Normal rate.   Pulmonary:      Effort: Pulmonary effort is normal.   Genitourinary:     Prostate: Enlarged (>60g, s/s/a). Not tender.      Rectum: No mass. Normal anal tone.   Musculoskeletal: Normal range of motion.   Skin:     General: Skin is warm and dry.      Findings: No rash.   Neurological:      Mental Status: He is alert and oriented to person, place, and time.         Assessment:       1. Elevated PSA    2. BPH  without urinary obstruction        Plan:       Elevated PSA  -     POCT urine dipstick without microscope    BPH without urinary obstruction      follow-up 1 year with repeat PSA

## 2021-04-16 ENCOUNTER — PATIENT MESSAGE (OUTPATIENT)
Dept: RESEARCH | Facility: HOSPITAL | Age: 66
End: 2021-04-16

## 2021-08-18 ENCOUNTER — TELEPHONE (OUTPATIENT)
Dept: UROLOGY | Facility: CLINIC | Age: 66
End: 2021-08-18

## 2021-08-18 ENCOUNTER — PATIENT MESSAGE (OUTPATIENT)
Dept: UROLOGY | Facility: CLINIC | Age: 66
End: 2021-08-18

## 2022-01-26 ENCOUNTER — OFFICE VISIT (OUTPATIENT)
Dept: UROLOGY | Facility: CLINIC | Age: 67
End: 2022-01-26
Payer: MEDICARE

## 2022-01-26 VITALS — WEIGHT: 149.69 LBS | HEIGHT: 66 IN | BODY MASS INDEX: 24.06 KG/M2

## 2022-01-26 DIAGNOSIS — N40.0 BPH WITHOUT URINARY OBSTRUCTION: ICD-10-CM

## 2022-01-26 DIAGNOSIS — R97.20 ELEVATED PSA: Primary | ICD-10-CM

## 2022-01-26 LAB
BILIRUB SERPL-MCNC: NORMAL MG/DL
BLOOD URINE, POC: NORMAL
CLARITY, POC UA: CLEAR
COLOR, POC UA: YELLOW
GLUCOSE UR QL STRIP: NORMAL
KETONES UR QL STRIP: NORMAL
LEUKOCYTE ESTERASE URINE, POC: NORMAL
NITRITE, POC UA: NORMAL
PH, POC UA: 6.5
PROTEIN, POC: NORMAL
SPECIFIC GRAVITY, POC UA: 1.01
UROBILINOGEN, POC UA: 0.2

## 2022-01-26 PROCEDURE — 99213 OFFICE O/P EST LOW 20 MIN: CPT | Mod: PBBFAC,PO | Performed by: UROLOGY

## 2022-01-26 PROCEDURE — 99999 PR PBB SHADOW E&M-EST. PATIENT-LVL III: CPT | Mod: PBBFAC,,, | Performed by: UROLOGY

## 2022-01-26 PROCEDURE — 99214 OFFICE O/P EST MOD 30 MIN: CPT | Mod: S$PBB,,, | Performed by: UROLOGY

## 2022-01-26 PROCEDURE — 99999 PR PBB SHADOW E&M-EST. PATIENT-LVL III: ICD-10-PCS | Mod: PBBFAC,,, | Performed by: UROLOGY

## 2022-01-26 PROCEDURE — 99214 PR OFFICE/OUTPT VISIT, EST, LEVL IV, 30-39 MIN: ICD-10-PCS | Mod: S$PBB,,, | Performed by: UROLOGY

## 2022-01-26 PROCEDURE — 81002 URINALYSIS NONAUTO W/O SCOPE: CPT | Mod: PBBFAC,PO | Performed by: UROLOGY

## 2022-01-26 NOTE — PROGRESS NOTES
Subjective:       Patient ID: Nikita Rush is a 66 y.o. male.    Chief Complaint: Annual Exam and Benign Prostatic Hypertrophy    HPI     66 year old with elevated PSA.  He gets regular annual screening and his previous PSA have all been low until Jan 2015, He PSA was increased to 4.2 and then repeat several months later and it increased to 5.7.  He underwent prostate biopsy June 2015 and the path was mostly benign, 1 core HGPIN.  He has no complaints today. His PSA then increased to 7.8 and he had a repeat biopsy in 03/2018.  Again path is one core HGPIN.  TRUS volume was 94.8 ml. He has no other symptoms today.  He denies dysuria frequency or urgency.  Again, we discussed screening PSA and its limitation.  We discussed the possibility of early stage prostate cancer.  We discussed repeat prostate needle biopsy.  We discussed prostate MRI and possible targeted biopsy.      Component PSA DIAGNOSTIC   Latest Ref Rng & Units 0.00 - 4.00 ng/mL   1/18/2022 9.1 (H)   11/16/2020 9.0 (H)   10/31/2019 7.4 (H)   3/15/2019 7.0 (H)   9/7/2018 7.3 (H)     Review of Systems   Constitutional: Negative for fever.   Genitourinary: Negative for dysuria and hematuria.       Objective:      Physical Exam  Vitals reviewed.   Constitutional:       Appearance: He is well-developed and well-nourished.   HENT:      Head: Normocephalic and atraumatic.   Eyes:      Conjunctiva/sclera: Conjunctivae normal.   Cardiovascular:      Rate and Rhythm: Normal rate.   Pulmonary:      Effort: Pulmonary effort is normal.   Genitourinary:     Prostate: Enlarged (50g, s/s/a). Not tender.      Rectum: No mass. Normal anal tone.   Musculoskeletal:         General: Normal range of motion.   Skin:     Findings: No rash.   Neurological:      Mental Status: He is alert and oriented to person, place, and time.   Psychiatric:         Mood and Affect: Mood and affect normal.         Assessment:       1. Elevated PSA    2. BPH without urinary obstruction         Plan:       Elevated PSA  -     POCT URINE DIPSTICK WITHOUT MICROSCOPE  -     MRI Prostate W W/O Contrast; Future; Expected date: 01/26/2022    BPH without urinary obstruction      will get baseline MRI.  If any suspicious lesions, we will consider targeted biopsy.

## 2022-02-03 ENCOUNTER — PATIENT MESSAGE (OUTPATIENT)
Dept: UROLOGY | Facility: CLINIC | Age: 67
End: 2022-02-03
Payer: MEDICARE

## 2022-10-05 ENCOUNTER — OFFICE VISIT (OUTPATIENT)
Dept: UROLOGY | Facility: CLINIC | Age: 67
End: 2022-10-05
Payer: MEDICARE

## 2022-10-05 VITALS — WEIGHT: 145 LBS | BODY MASS INDEX: 23.3 KG/M2 | HEIGHT: 66 IN

## 2022-10-05 DIAGNOSIS — R97.20 ELEVATED PSA: Primary | ICD-10-CM

## 2022-10-05 DIAGNOSIS — N40.0 BPH WITHOUT URINARY OBSTRUCTION: ICD-10-CM

## 2022-10-05 PROCEDURE — 99214 PR OFFICE/OUTPT VISIT, EST, LEVL IV, 30-39 MIN: ICD-10-PCS | Mod: S$PBB,,, | Performed by: UROLOGY

## 2022-10-05 PROCEDURE — 99999 PR PBB SHADOW E&M-EST. PATIENT-LVL III: CPT | Mod: PBBFAC,,, | Performed by: UROLOGY

## 2022-10-05 PROCEDURE — 99999 PR PBB SHADOW E&M-EST. PATIENT-LVL III: ICD-10-PCS | Mod: PBBFAC,,, | Performed by: UROLOGY

## 2022-10-05 PROCEDURE — 99214 OFFICE O/P EST MOD 30 MIN: CPT | Mod: S$PBB,,, | Performed by: UROLOGY

## 2022-10-05 PROCEDURE — 99213 OFFICE O/P EST LOW 20 MIN: CPT | Mod: PBBFAC,PO | Performed by: UROLOGY

## 2022-10-05 NOTE — PROGRESS NOTES
Subjective:       Patient ID: Nikita Rush is a 67 y.o. male.    Chief Complaint: No chief complaint on file.    HPI    67-year-old with elevated PSA    67 year old with elevated PSA.  His PSA increased to 5.7.  He underwent prostate biopsy June 2015 and the path was mostly benign, 1 core HGPIN.  His PSA then increased to 7.8 and he had a repeat biopsy in 03/2018.  Again path is one core HGPIN.  TRUS volume was 94.8 ml.  He had a prostate MRI in January 2022.  Prostate volume was 110 mL on imaging.  There was 1 PiRADS 2 lesion otherwise normal appearing prostate.  His PSA is now increased to 11.  Again, we discussed screening PSA and its limitation.  We discussed the possibility of early stage prostate cancer.  We discussed repeat prostate needle biopsy.    Urine dipstick shows negative for nitrites, red blood cells, glucose, protein, positive for 1+leukocytes.     Component PSA DIAGNOSTIC   Latest Ref Rng & Units 0.00 - 4.00 ng/mL   8/22/2022 11.0 (H)   1/18/2022 9.1 (H)   11/16/2020 9.0 (H)   10/31/2019 7.4 (H)   3/15/2019 7.0 (H)   9/7/2018 7.3 (H)       Review of Systems   Constitutional:  Negative for fever.   Genitourinary:  Negative for dysuria and hematuria.     Objective:      Physical Exam  Vitals reviewed.   Constitutional:       Appearance: He is well-developed.   HENT:      Head: Normocephalic and atraumatic.   Eyes:      Conjunctiva/sclera: Conjunctivae normal.   Cardiovascular:      Rate and Rhythm: Normal rate.   Pulmonary:      Effort: Pulmonary effort is normal.   Genitourinary:     Prostate: Enlarged (>60g, s/s/a). Not tender.      Rectum: No mass. Normal anal tone.   Musculoskeletal:         General: Normal range of motion.   Skin:     General: Skin is warm and dry.      Findings: No rash.   Neurological:      Mental Status: He is alert and oriented to person, place, and time.       Assessment:       1. Elevated PSA    2. BPH without urinary obstruction        Plan:       Elevated PSA  -      Prostate Specific Antigen, Diagnostic; Future; Expected date: 04/05/2023    BPH without urinary obstruction      We will continue to observe.  Follow up 6 months with repeat PSA

## 2023-04-05 ENCOUNTER — PATIENT MESSAGE (OUTPATIENT)
Dept: UROLOGY | Facility: CLINIC | Age: 68
End: 2023-04-05

## 2023-04-05 ENCOUNTER — OFFICE VISIT (OUTPATIENT)
Dept: UROLOGY | Facility: CLINIC | Age: 68
End: 2023-04-05
Payer: MEDICARE

## 2023-04-05 VITALS — HEIGHT: 66 IN | BODY MASS INDEX: 24.06 KG/M2 | WEIGHT: 149.69 LBS

## 2023-04-05 DIAGNOSIS — N40.0 BPH WITHOUT URINARY OBSTRUCTION: ICD-10-CM

## 2023-04-05 DIAGNOSIS — R97.20 ELEVATED PSA: Primary | ICD-10-CM

## 2023-04-05 LAB
BILIRUBIN, UA POC OHS: NEGATIVE
BLOOD, UA POC OHS: NEGATIVE
CLARITY, UA POC OHS: CLEAR
COLOR, UA POC OHS: YELLOW
GLUCOSE, UA POC OHS: NEGATIVE
KETONES, UA POC OHS: NEGATIVE
LEUKOCYTES, UA POC OHS: NEGATIVE
NITRITE, UA POC OHS: NEGATIVE
PH, UA POC OHS: 5.5
PROTEIN, UA POC OHS: NEGATIVE
SPECIFIC GRAVITY, UA POC OHS: >=1.03
UROBILINOGEN, UA POC OHS: 0.2

## 2023-04-05 PROCEDURE — 99999 PR PBB SHADOW E&M-EST. PATIENT-LVL II: CPT | Mod: PBBFAC,,, | Performed by: UROLOGY

## 2023-04-05 PROCEDURE — 81003 URINALYSIS AUTO W/O SCOPE: CPT | Mod: PBBFAC,PO | Performed by: UROLOGY

## 2023-04-05 PROCEDURE — 99214 OFFICE O/P EST MOD 30 MIN: CPT | Mod: S$PBB,,, | Performed by: UROLOGY

## 2023-04-05 PROCEDURE — 99212 OFFICE O/P EST SF 10 MIN: CPT | Mod: PBBFAC,PO | Performed by: UROLOGY

## 2023-04-05 PROCEDURE — 99214 PR OFFICE/OUTPT VISIT, EST, LEVL IV, 30-39 MIN: ICD-10-PCS | Mod: S$PBB,,, | Performed by: UROLOGY

## 2023-04-05 PROCEDURE — 99999 PR PBB SHADOW E&M-EST. PATIENT-LVL II: ICD-10-PCS | Mod: PBBFAC,,, | Performed by: UROLOGY

## 2023-04-05 NOTE — PROGRESS NOTES
Subjective:       Patient ID: Nikita Rush is a 67 y.o. male.    Chief Complaint: Other (Psa 6 mnth f/u)    HPI    67 year old with elevated PSA.  His PSA increased to 5.7.  He underwent prostate biopsy June 2015 and the path was mostly benign, 1 core HGPIN.  His PSA then increased to 7.8 and he had a repeat biopsy in 03/2018.  Again path is one core HGPIN.  TRUS volume was 94.8 ml.  He had a prostate MRI in January 2022.  Prostate volume was 110 mL on imaging.  There was 1 PiRADS 2 lesion otherwise normal appearing prostate.  His last PSA is 9.4 in decreased from 11.  Again, we discussed screening PSA and its limitation.  We discussed the possibility of early stage prostate cancer.  We discussed repeat prostate needle biopsy.    Urine dipstick shows negative for all components.       Component PSA DIAGNOSTIC   Latest Ref Rng & Units 0.00 - 4.00 ng/mL   3/30/2023 9.36 (H)   8/22/2022 11.0 (H)   1/18/2022 9.1 (H)   11/16/2020 9.0 (H)   10/31/2019 7.4 (H)   3/15/2019 7.0 (H)   9/7/2018 7.3 (H)       Review of Systems   Constitutional:  Negative for fever.   Genitourinary:  Negative for dysuria and hematuria.     Objective:      Physical Exam  Vitals reviewed.   Constitutional:       Appearance: He is well-developed.   Pulmonary:      Effort: Pulmonary effort is normal.   Abdominal:      Palpations: Abdomen is soft.   Genitourinary:     Prostate: Enlarged (60g, s/s/a). Not tender and no nodules present.   Skin:     Findings: No rash.   Neurological:      Mental Status: He is alert and oriented to person, place, and time.       Assessment:       1. Elevated PSA    2. BPH without urinary obstruction        Plan:       Elevated PSA    BPH without urinary obstruction  -     POCT Urinalysis(Instrument)      We will continue to observe.  Follow up 6 months with repeat PSA

## 2023-10-27 ENCOUNTER — OFFICE VISIT (OUTPATIENT)
Dept: UROLOGY | Facility: CLINIC | Age: 68
End: 2023-10-27
Payer: MEDICARE

## 2023-10-27 VITALS — WEIGHT: 149.94 LBS | HEIGHT: 66 IN | BODY MASS INDEX: 24.1 KG/M2

## 2023-10-27 DIAGNOSIS — N40.0 BPH WITHOUT URINARY OBSTRUCTION: ICD-10-CM

## 2023-10-27 DIAGNOSIS — R97.20 ELEVATED PSA: Primary | ICD-10-CM

## 2023-10-27 LAB
BILIRUBIN, UA POC OHS: NEGATIVE
BLOOD, UA POC OHS: NEGATIVE
CLARITY, UA POC OHS: CLEAR
COLOR, UA POC OHS: YELLOW
GLUCOSE, UA POC OHS: NEGATIVE
KETONES, UA POC OHS: NEGATIVE
LEUKOCYTES, UA POC OHS: ABNORMAL
NITRITE, UA POC OHS: NEGATIVE
PH, UA POC OHS: 6
PROTEIN, UA POC OHS: NEGATIVE
SPECIFIC GRAVITY, UA POC OHS: 1.01
UROBILINOGEN, UA POC OHS: 0.2

## 2023-10-27 PROCEDURE — 99214 OFFICE O/P EST MOD 30 MIN: CPT | Mod: S$PBB,,, | Performed by: UROLOGY

## 2023-10-27 PROCEDURE — 99999PBSHW POCT URINALYSIS(INSTRUMENT): ICD-10-PCS | Mod: PBBFAC,,,

## 2023-10-27 PROCEDURE — 99214 PR OFFICE/OUTPT VISIT, EST, LEVL IV, 30-39 MIN: ICD-10-PCS | Mod: S$PBB,,, | Performed by: UROLOGY

## 2023-10-27 PROCEDURE — 81003 URINALYSIS AUTO W/O SCOPE: CPT | Mod: PBBFAC,PO | Performed by: UROLOGY

## 2023-10-27 PROCEDURE — 99213 OFFICE O/P EST LOW 20 MIN: CPT | Mod: PBBFAC,PO | Performed by: UROLOGY

## 2023-10-27 PROCEDURE — 99999 PR PBB SHADOW E&M-EST. PATIENT-LVL III: CPT | Mod: PBBFAC,,, | Performed by: UROLOGY

## 2023-10-27 PROCEDURE — 99999PBSHW POCT URINALYSIS(INSTRUMENT): Mod: PBBFAC,,,

## 2023-10-27 PROCEDURE — 99999 PR PBB SHADOW E&M-EST. PATIENT-LVL III: ICD-10-PCS | Mod: PBBFAC,,, | Performed by: UROLOGY

## 2023-10-27 NOTE — PROGRESS NOTES
Subjective:       Patient ID: Nikita Rush is a 68 y.o. male.    Chief Complaint: Elevated PSA    HPI    68 year old with elevated PSA.  His PSA increased to 5.7 and he underwent prostate biopsy June 2015.  The path was benign except 1 core HGPIN.  His PSA then increased to 7.8 and he had a repeat biopsy in 03/2018.  Again path is one core HGPIN.  TRUS volume was 94.8 ml.  He had a prostate MRI in January 2022.  Prostate volume was 110 mL on imaging.  There was a single PiRADS 2 lesion but otherwise normal appearing prostate.  His last PSA is 8.78 and decreased.  Again, we discussed screening PSA and its limitation.  We discussed the possibility of early stage prostate cancer.  We discussed repeat prostate needle biopsy.    Urine dipstick shows negative for all components except trace leukocyte.        Component PSA DIAGNOSTIC   Latest Ref Rng & Units 0.00 - 4.00 ng/mL   9/28/2023 8.78 (H)   3/30/2023 9.36 (H)   8/22/2022 11.0 (H)   1/18/2022 9.1 (H)   11/16/2020 9.0 (H)   10/31/2019 7.4 (H)   3/15/2019 7.0 (H)   9/7/2018 7.3 (H)     Review of Systems   Constitutional:  Negative for fever.   Genitourinary:  Negative for dysuria and hematuria.       Objective:      Physical Exam  Vitals reviewed.   Constitutional:       Appearance: He is well-developed.   Pulmonary:      Effort: Pulmonary effort is normal.   Genitourinary:     Prostate: Enlarged (>60g). Not tender and no nodules present.   Skin:     Findings: No rash.   Neurological:      Mental Status: He is alert and oriented to person, place, and time.         Assessment:       1. Elevated PSA    2. BPH without urinary obstruction        Plan:       Elevated PSA  -     POCT Urinalysis(Instrument)  -     Prostate Specific Antigen, Diagnostic; Future; Expected date: 10/27/2024    BPH without urinary obstruction      Annual follow-up with repeat PSA

## 2024-10-21 ENCOUNTER — LAB VISIT (OUTPATIENT)
Dept: LAB | Facility: HOSPITAL | Age: 69
End: 2024-10-21
Attending: FAMILY MEDICINE
Payer: MEDICARE

## 2024-10-21 DIAGNOSIS — R97.20 ELEVATED PSA: ICD-10-CM

## 2024-10-21 LAB — COMPLEXED PSA SERPL-MCNC: 12.1 NG/ML (ref 0–4)

## 2024-10-21 PROCEDURE — 36415 COLL VENOUS BLD VENIPUNCTURE: CPT | Performed by: UROLOGY

## 2024-10-21 PROCEDURE — 84153 ASSAY OF PSA TOTAL: CPT | Performed by: UROLOGY

## 2024-10-28 ENCOUNTER — OFFICE VISIT (OUTPATIENT)
Dept: UROLOGY | Facility: CLINIC | Age: 69
End: 2024-10-28
Payer: MEDICARE

## 2024-10-28 VITALS — HEIGHT: 66 IN | WEIGHT: 144.38 LBS | BODY MASS INDEX: 23.2 KG/M2

## 2024-10-28 DIAGNOSIS — R97.20 ELEVATED PSA: Primary | ICD-10-CM

## 2024-10-28 DIAGNOSIS — N40.0 BPH WITHOUT URINARY OBSTRUCTION: ICD-10-CM

## 2024-10-28 LAB
BILIRUBIN, UA POC OHS: NEGATIVE
BLOOD, UA POC OHS: ABNORMAL
CLARITY, UA POC OHS: CLEAR
COLOR, UA POC OHS: YELLOW
GLUCOSE, UA POC OHS: NEGATIVE
KETONES, UA POC OHS: NEGATIVE
LEUKOCYTES, UA POC OHS: ABNORMAL
NITRITE, UA POC OHS: NEGATIVE
PH, UA POC OHS: 6
PROTEIN, UA POC OHS: NEGATIVE
SPECIFIC GRAVITY, UA POC OHS: 1.01
UROBILINOGEN, UA POC OHS: 0.2

## 2024-10-28 PROCEDURE — 99214 OFFICE O/P EST MOD 30 MIN: CPT | Mod: S$PBB,,, | Performed by: UROLOGY

## 2024-10-28 PROCEDURE — 81003 URINALYSIS AUTO W/O SCOPE: CPT | Mod: PBBFAC,PO | Performed by: UROLOGY

## 2024-10-28 PROCEDURE — 99999PBSHW POCT URINALYSIS(INSTRUMENT): Mod: PBBFAC,,,

## 2024-10-28 PROCEDURE — 99213 OFFICE O/P EST LOW 20 MIN: CPT | Mod: PBBFAC,PO | Performed by: UROLOGY

## 2024-10-28 PROCEDURE — 99999 PR PBB SHADOW E&M-EST. PATIENT-LVL III: CPT | Mod: PBBFAC,,, | Performed by: UROLOGY

## 2024-10-28 RX ORDER — ACETAMINOPHEN 500 MG
1 TABLET ORAL DAILY
COMMUNITY
Start: 2024-10-21

## 2024-12-23 ENCOUNTER — TELEPHONE (OUTPATIENT)
Dept: UROLOGY | Facility: CLINIC | Age: 69
End: 2024-12-23
Payer: MEDICARE

## 2024-12-23 DIAGNOSIS — R97.20 ELEVATED PSA: Primary | ICD-10-CM

## 2024-12-23 NOTE — TELEPHONE ENCOUNTER
----- Message from Baron Webb sent at 12/20/2024  1:26 PM CST -----    ----- Message -----  From: MARTY So MD  Sent: 12/20/2024  11:58 AM CST  To: MARTY So Staff [facility]    Please call patient and schedule prostate needle biopsy using [place] guidance.  I sent Levaquin to his pharmacy.

## 2025-01-10 ENCOUNTER — TELEPHONE (OUTPATIENT)
Dept: UROLOGY | Facility: CLINIC | Age: 70
End: 2025-01-10
Payer: MEDICARE

## 2025-01-10 DIAGNOSIS — R97.20 ELEVATED PSA: Primary | ICD-10-CM

## 2025-01-10 NOTE — TELEPHONE ENCOUNTER
----- Message from MARTY So MD sent at 1/10/2025  2:18 PM CST -----  I discussed biopsy results with patient.  All benign except 1 core with atypia.  I recommend observation.  Please schedule annual follow-up with repeat PSA.

## (undated) DEVICE — SOL IRR STRL WATER 500ML

## (undated) DEVICE — COVER TRANSDUCER LATEX N/STERI

## (undated) DEVICE — PAD PREP 50/CA

## (undated) DEVICE — NDL SPINAL 22GX7 SPINOCAN

## (undated) DEVICE — NEEDLE HYPO 18X1.5 SG

## (undated) DEVICE — SEE MEDLINE ITEM 157128

## (undated) DEVICE — SYR 50CC LL

## (undated) DEVICE — NDL TISSUE BIOPSY MAGNUM

## (undated) DEVICE — GLOVE BIOGEL ECLIPSE SZ 7.5

## (undated) DEVICE — FORMALIN 60ML PREFILLED CONT

## (undated) DEVICE — SEE MEDLINE ITEM 146362

## (undated) DEVICE — SCRUB 10% POVIDONE IODINE 4OZ

## (undated) DEVICE — GAUZE SPONGE 4'X4' 8PLY NS

## (undated) DEVICE — SYR 10CC LUER LOCK